# Patient Record
Sex: MALE | Race: WHITE | Employment: UNEMPLOYED | URBAN - METROPOLITAN AREA
[De-identification: names, ages, dates, MRNs, and addresses within clinical notes are randomized per-mention and may not be internally consistent; named-entity substitution may affect disease eponyms.]

---

## 2017-06-01 ENCOUNTER — OFFICE VISIT (OUTPATIENT)
Dept: FAMILY MEDICINE CLINIC | Age: 27
End: 2017-06-01

## 2017-06-01 VITALS
OXYGEN SATURATION: 95 % | SYSTOLIC BLOOD PRESSURE: 121 MMHG | RESPIRATION RATE: 16 BRPM | DIASTOLIC BLOOD PRESSURE: 74 MMHG | WEIGHT: 172 LBS | HEART RATE: 94 BPM | HEIGHT: 68 IN | BODY MASS INDEX: 26.07 KG/M2 | TEMPERATURE: 97.9 F

## 2017-06-01 DIAGNOSIS — F41.9 ANXIETY: Primary | ICD-10-CM

## 2017-06-01 DIAGNOSIS — Z72.0 TOBACCO ABUSE: ICD-10-CM

## 2017-06-01 DIAGNOSIS — F90.9 ATTENTION DEFICIT HYPERACTIVITY DISORDER (ADHD), UNSPECIFIED ADHD TYPE: ICD-10-CM

## 2017-06-01 RX ORDER — DEXTROAMPHETAMINE SACCHARATE, AMPHETAMINE ASPARTATE, DEXTROAMPHETAMINE SULFATE AND AMPHETAMINE SULFATE 7.5; 7.5; 7.5; 7.5 MG/1; MG/1; MG/1; MG/1
30 TABLET ORAL
COMMUNITY
End: 2017-07-04

## 2017-06-01 RX ORDER — BUSPIRONE HYDROCHLORIDE 10 MG/1
10 TABLET ORAL 2 TIMES DAILY
Qty: 60 TAB | Refills: 3 | Status: SHIPPED | OUTPATIENT
Start: 2017-06-01 | End: 2017-07-04

## 2017-06-01 NOTE — PROGRESS NOTES
HPI  Bertram Kebede is a 33 y.o. male being seen today for has a history of ADHD and has been in [facility]. Has hx of PTSD and anxiety and was taking adderrall for this. Had tried paxil in the past which didn't help and may have taken wellbutrin in the past but unsure if it helped. Was seeing family practice Dr who gave him adderrall and never saw psych. Chief Complaint   Patient presents with   [address] Problem   . he states that see HPI    Past Medical History:   Diagnosis Date    ADHD (attention deficit hyperactivity disorder)     Dyslexia          ROS  Patient states that he is feeling well. Denies complaints of chest pain, shortness of breath, swelling of legs, dizziness or weakness. he denies nausea, vomiting or diarrhea. Current Outpatient Prescriptions   Medication Sig    dextroamphetamine-amphetamine (ADDERALL) 30 mg tablet Take 30 mg by mouth.  busPIRone (BUSPAR) 10 mg tablet Take 1 Tab by mouth two (2) times a day. No current facility-administered medications for this visit. PE  Visit Vitals    /74 (BP 1 Location: Left arm, BP Patient Position: Sitting)    Pulse 94    Temp 97.9 °F (36.6 °C) (Oral)    Resp 16    Ht 5' 8\" (1.727 m)    Wt 172 lb (78 kg)    SpO2 95%    BMI 26.15 kg/m2        Alert and oriented with normal mood and affect. he is well developed and well nourished . Lungs are clear without wheezing. Heart rate is regular without murmurs or gallops. There is no lower extremity edema. No results found for this or any previous visit. Assessment and Plan:        ICD-10-CM ICD-9-CM    1. Anxiety F41.9 300.00    2. Attention deficit hyperactivity disorder (ADHD), unspecified ADHD type F90.9 314.01    3.  Tobacco abuse Z72.0 305.1      Trial of buspar and CSB for pt's area info provided with note stating that our clinic cannot provide medications such as adderrall      Faby Domingo NP

## 2017-06-01 NOTE — MR AVS SNAPSHOT
Visit Information Date & Time Provider Department Dept. Phone Encounter #  
 6/1/2017 10:30 AM Kell Dent  [facility] [id number] Upcoming Health Maintenance Date Due DTaP/Tdap/Td series (1 - Tdap) 3/12/2012 INFLUENZA AGE 9 TO ADULT 8/1/2017 Allergies as of 6/1/2017  Review Complete On: 6/1/2017 By: Sofia Alonso No Known Allergies Current Immunizations  Never Reviewed No immunizations on file. Not reviewed this visit You Were Diagnosed With   
  
 Codes Comments Anxiety    -  Primary ICD-10-CM: F41.9 ICD-9-CM: 300.00 Attention deficit hyperactivity disorder (ADHD), unspecified ADHD type     ICD-10-CM: F90.9 ICD-9-CM: 314.01 Tobacco abuse     ICD-10-CM: Z72.0 ICD-9-CM: 305.1 Vitals BP Pulse Temp Resp Height(growth percentile) Weight(growth percentile) 121/74 (BP 1 Location: Left arm, BP Patient Position: Sitting) 94 97.9 °F (36.6 °C) (Oral) 16 5' 8\" (1.727 m) 172 lb (78 kg) SpO2 BMI Smoking Status 95% 26.15 kg/m2 Current Every Day Smoker Vitals History BMI and BSA Data Body Mass Index Body Surface Area  
 26.15 kg/m 2 1.93 m 2 Preferred Pharmacy Pharmacy Name Phone [facility] [place] [address] [phone] Your Updated Medication List  
  
   
This list is accurate as of: 6/1/17 11:58 AM.  Always use your most recent med list.  
  
  
  
  
 ADDERALL 30 mg tablet Generic drug:  dextroamphetamine-amphetamine Take 30 mg by mouth.  
  
 busPIRone 10 mg tablet Commonly known as:  BUSPAR Take 1 Tab by mouth two (2) times a day. Prescriptions Sent to Pharmacy Refills  
 busPIRone (BUSPAR) 10 mg tablet 3 Sig: Take 1 Tab by mouth two (2) times a day. Class: Normal  
 Pharmacy: [address] [place] [address], [address] [place] [address] Ph #: [phone] Route: Oral  
  
Patient Instructions [address] Please call and tell them that your new doctor cannot prescribe adderrall and this was what worked for him in the past - had side effects from other medications. All patient appointments and eligibility confirmations are by appointment only. Please call [phone] during regular business hours to be scheduled. Business Hours:  
Monday - Friday: 8:00 a.m. - 5:00 p.m. Medical Clinic Hours:  
Monday: 9:00 a.m. - 4:00 p.m. Tuesday: 12:00 p.m. - 8:00 p.m. Wednesday: 8:00 a.m. - 4:00 p.m. Thursday: 9:00 a.m. - 4:00 p.m. Friday: 9:00 a.m. - 1:00 p.m. Introducing [facility] SERVICES! [employer] introduces [employer] patient portal. Now you can access parts of your medical record, email your doctor's office, and request medication refills online. 1. In your internet browser, go to [email or web address] 2. Click on the First Time User? Click Here link in the Sign In box. You will see the New Member Sign Up page. 3. Enter your DDN Access Code exactly as it appears below. You will not need to use this code after youve completed the sign-up process. If you do not sign up before the expiration date, you must request a new code. · DDN Access Code: [email or web address] Expires: 8/30/2017 11:58 AM 
 
4. Enter the last four digits of your Social Security Number (xxxx) and Date of Birth (mm/dd/yyyy) as indicated and click Submit. You will be taken to the next sign-up page. 5. Create a Beept ID. This will be your DDN login ID and cannot be changed, so think of one that is secure and easy to remember. 6. Create a Beept password. You can change your password at any time. 7. Enter your Password Reset Question and Answer. This can be used at a later time if you forget your password. 8. Enter your e-mail address. You will receive e-mail notification when new information is available in [address] 9. Click Sign Up. You can now view and download portions of your medical record. 10. Click the Download Summary menu link to download a portable copy of your medical information. If you have questions, please visit the Frequently Asked Questions section of the [facility] website. Remember, [facility] is NOT to be used for urgent needs. For medical emergencies, dial 911. Now available from your iPhone and Android! Please provide this summary of care documentation to your next provider. If you have any questions after today's visit, please call [phone].

## 2017-06-01 NOTE — PATIENT INSTRUCTIONS
[address] [place] [address] INFO    Please call and tell them that your new doctor cannot prescribe adderrall and this was what worked for him in the past - had side effects from other medications. All patient appointments and eligibility confirmations are by appointment only. Please call [phone] during regular business hours to be scheduled. Business Hours:   Monday - Friday: 8:00 a.m. - 5:00 p.m. Medical Clinic Hours:   Monday: 9:00 a.m. - 4:00 p.m. Tuesday: 12:00 p.m. - 8:00 p.m. Wednesday: 8:00 a.m. - 4:00 p.m. Thursday: 9:00 a.m. - 4:00 p.m. Friday: 9:00 a.m. - 1:00 p.m.

## 2017-06-01 NOTE — PROGRESS NOTES
No chief complaint on file. 1. When and where did you last receive medical care? Yes When: 5/2017 Where: [facility] Reason for visit: Psych Meds    2. When and where did you last have preventive care such as mammogram, pap smears or colon screening? No    3. What is your current living situation (for example, live alone, live in home with immediate family members)? Family    4. Do you have any problems with communication such trouble seeing, hearing, or understanding instructions? No    5. Do you have an advance directive? This is a document that you can give to family members with instructions for how you would want them to make health care decisions for you if you were unable to speak for yourself. (For example, unconscious, delerious)No    PMH/FH/Social Hx reviewed and updated as needed     Applicable screenings reviewed and updated as needed  Medication reconciliation performed. Patient does need medication refills. Health Maintenance reviewed.

## 2017-06-01 NOTE — PROGRESS NOTES
Discharge instructions reviewed with patient    Medication list and understanding of medications reviewed with patient. OTC and herbal medications reviewed and added to med list if applicable  Barriers to adherence assessed. Guidance given regarding new medications this visit, including reason for taking this medicine, and common side effects.     Given resources for [facility]

## 2017-06-02 NOTE — PROGRESS NOTES
I have reviewed the attatched progress note and I agree with the plan and medical decision making as outlined by Vini Baker MD

## 2017-07-04 ENCOUNTER — HOSPITAL ENCOUNTER (EMERGENCY)
Age: 27
Discharge: HOME | End: 2017-07-04
Attending: EMERGENCY MEDICINE
Payer: SELF-PAY

## 2017-07-04 VITALS
BODY MASS INDEX: 22.4 KG/M2 | DIASTOLIC BLOOD PRESSURE: 78 MMHG | TEMPERATURE: 98 F | RESPIRATION RATE: 18 BRPM | WEIGHT: 169 LBS | HEIGHT: 73 IN | SYSTOLIC BLOOD PRESSURE: 127 MMHG | OXYGEN SATURATION: 98 % | HEART RATE: 97 BPM

## 2017-07-04 DIAGNOSIS — L03.211 CELLULITIS OF FACE: Primary | ICD-10-CM

## 2017-07-04 PROCEDURE — 99282 EMERGENCY DEPT VISIT SF MDM: CPT

## 2017-07-04 RX ORDER — SULFAMETHOXAZOLE AND TRIMETHOPRIM 800; 160 MG/1; MG/1
2 TABLET ORAL 2 TIMES DAILY
Qty: 28 TAB | Refills: 0 | Status: SHIPPED | OUTPATIENT
Start: 2017-07-04 | End: 2017-07-11

## 2017-07-04 NOTE — ED PROVIDER NOTES
HPI Comments: 33 y.o. male with past medical history significant for dyslexia and ADHD who presents with chief complaint of \"insect bite. \"  Patient began to notice some swelling and \"soreness\" underneath the chin ~8 hours PTA. Patient suspects that he might have been \"bitten by a spider,\" but is unable to recall having actually been bitten. Patient denies any h/o diabetes, and denies being on any medications regularly. Patient reports shaving regularly. Patient mentions receiving tetanus vaccination within the past 1-2 years. Patient denies any fever, nausea/vomiting, trouble swallowing or breathing or dental problems. There are no other acute medical concerns at this time. Social hx: +tobacco smoker; denies EtOH; denies illicit drug use    Note written by Stacy Conti, as dictated by Anita Figueroa MD 11:20 AM    The history is provided by the patient. Past Medical History:   Diagnosis Date    ADHD (attention deficit hyperactivity disorder)     Dyslexia        No past surgical history on file. Family History:   Problem Relation Age of Onset    No Known Problems Mother     No Known Problems Sister     No Known Problems Brother        Social History     Social History    Marital status: SINGLE     Spouse name: N/A    Number of children: N/A    Years of education: N/A     Occupational History    Not on file. Social History Main Topics    Smoking status: Current Every Day Smoker     Packs/day: 1.50    Smokeless tobacco: Former User    Alcohol use Yes      Comment: Social    Drug use: No    Sexual activity: Not on file     Other Topics Concern    Not on file     Social History Narrative    No narrative on file         ALLERGIES: Review of patient's allergies indicates no known allergies. Review of Systems   Constitutional: Negative for fever. HENT: Negative for dental problem. Gastrointestinal: Negative for nausea and vomiting. Skin: Positive for wound.    All other systems reviewed and are negative. Vitals:    07/04/17 1112   BP: 127/78   Pulse: 97   Resp: 18   Temp: 98 °F (36.7 °C)   SpO2: 98%   Weight: 76.7 kg (169 lb)   Height: 6' 1\" (1.854 m)            Physical Exam   Constitutional: He is oriented to person, place, and time. He appears well-developed and well-nourished. No distress. HENT:   Head: Normocephalic and atraumatic. Edentulous; no pain or swelling of gums, floor of mouth soft, no trismus; 4 cm area of induration under chin; no fluctuance or drainage   Eyes: Conjunctivae are normal.   Neck: Normal range of motion. No tracheal deviation present. Cardiovascular: Normal rate, regular rhythm, normal heart sounds and intact distal pulses. Exam reveals no friction rub. No murmur heard. Pulmonary/Chest: Effort normal and breath sounds normal. No stridor. No respiratory distress. He has no wheezes. He has no rales. Abdominal: Soft. Bowel sounds are normal. He exhibits no distension. There is no tenderness. There is no rebound and no guarding. Musculoskeletal: Normal range of motion. Lymphadenopathy:     He has no cervical adenopathy. Neurological: He is alert and oriented to person, place, and time. Coordination normal.   Skin: Skin is warm and dry. He is not diaphoretic. No pallor. Psychiatric: He has a normal mood and affect. His behavior is normal.   Nursing note and vitals reviewed. MDM  Number of Diagnoses or Management Options  Cellulitis of face:   Diagnosis management comments: Likely cellulitis or folliculitis from shaving.  Spoke with pt at length about need for follow up and if swelling increases or develops trouble breathing or swallowing will need to return    Patient Progress  Patient progress: stable    ED Course       Procedures

## 2017-07-04 NOTE — DISCHARGE INSTRUCTIONS
We hope that we have addressed all of your medical concerns. The examination and treatment you received in the Emergency Department were for an emergent problem and were not intended as complete care. It is important that you follow up with your healthcare provider(s) for ongoing care. If your symptoms worsen or do not improve as expected, and you are unable to reach your usual health care provider(s), you should return to the Emergency Department. Today's healthcare is undergoing tremendous change, and patient satisfaction surveys are one of the many tools to assess the quality of medical care. You may receive a survey from the [employer] organization regarding your experience in the Emergency Department. I hope that your experience has been completely positive, particularly the medical care that I provided. As such, please participate in the survey; anything less than excellent does not meet my expectations or intentions. [facility] and 8 [facility] participate in nationally recognized quality of care measures. If your blood pressure is greater than 120/80, as reported below, we urge that you seek medical care to address the potential of high blood pressure, commonly known as hypertension. Hypertension can be hereditary or can be caused by certain medical conditions, pain, stress, or \"white coat syndrome. \"       Please make an appointment with your health care provider(s) for follow up of your Emergency Department visit. VITALS:   Patient Vitals for the past 8 hrs:   Temp Pulse Resp BP SpO2   07/04/17 1112 98 °F (36.7 °C) 97 18 127/78 98 %          Thank you for allowing us to provide you with medical care today. We realize that you have many choices for your emergency care needs. Please choose us in the future for any continued health care needs.       Nivia Cast MD    [facility]   Office: [phone]            No results found for this or any previous visit (from the past 24 hour(s)). No results found. Cellulitis: Care Instructions  Your Care Instructions    Cellulitis is a skin infection. It often occurs after a break in the skin from a scrape, cut, bite, or puncture, or after a rash. The doctor has checked you carefully, but problems can develop later. If you notice any problems or new symptoms, get medical treatment right away. Follow-up care is a key part of your treatment and safety. Be sure to make and go to all appointments, and call your doctor if you are having problems. It's also a good idea to know your test results and keep a list of the medicines you take. How can you care for yourself at home? · Take your antibiotics as directed. Do not stop taking them just because you feel better. You need to take the full course of antibiotics. · Prop up the infected area on pillows to reduce pain and swelling. Try to keep the area above the level of your heart as often as you can. · If your doctor told you how to care for your wound, follow your doctor's instructions. If you did not get instructions, follow this general advice:  ¨ Wash the wound with clean water 2 times a day. Don't use hydrogen peroxide or alcohol, which can slow healing. ¨ You may cover the wound with a thin layer of petroleum jelly, such as Vaseline, and a nonstick bandage. ¨ Apply more petroleum jelly and replace the bandage as needed. · Be safe with medicines. Take pain medicines exactly as directed. ¨ If the doctor gave you a prescription medicine for pain, take it as prescribed. ¨ If you are not taking a prescription pain medicine, ask your doctor if you can take an over-the-counter medicine. To prevent cellulitis in the future  · Try to prevent cuts, scrapes, or other injuries to your skin. Cellulitis most often occurs where there is a break in the skin.   · If you get a scrape, cut, mild burn, or bite, wash the wound with clean water as soon as you can to help avoid infection. Don't use hydrogen peroxide or alcohol, which can slow healing. · If you have swelling in your legs (edema), support stockings and good skin care may help prevent leg sores and cellulitis. · Take care of your feet, especially if you have diabetes or other conditions that increase the risk of infection. Wear shoes and socks. Do not go barefoot. If you have athlete's foot or other skin problems on your feet, talk to your doctor about how to treat them. When should you call for help? Call your doctor now or seek immediate medical care if:  · You have signs that your infection is getting worse, such as:  ¨ Increased pain, swelling, warmth, or redness. ¨ Red streaks leading from the area. ¨ Pus draining from the area. ¨ A fever. · You get a rash. Watch closely for changes in your health, and be sure to contact your doctor if:  · You are not getting better after 1 day (24 hours). · You do not get better as expected. Where can you learn more? Go to http://gloria-yolette.info/. Harish Mariscal in the search box to learn more about \"Cellulitis: Care Instructions. \"  Current as of: October 13, 2016  Content Version: 11.3  © 4950-6640 [employer]. Care instructions adapted under license by [employer] (which disclaims liability or warranty for this information). If you have questions about a medical condition or this instruction, always ask your healthcare professional. [place] [address] any warranty or liability for your use of this information.

## 2018-01-16 ENCOUNTER — HOSPITAL ENCOUNTER (EMERGENCY)
Age: 28
Discharge: OTHER INSTITUTION | End: 2018-01-17
Attending: EMERGENCY MEDICINE
Payer: SELF-PAY

## 2018-01-16 DIAGNOSIS — M46.46 DISCITIS OF LUMBAR REGION: Primary | ICD-10-CM

## 2018-01-16 LAB
ANION GAP SERPL CALC-SCNC: 11 MMOL/L (ref 3–18)
BASOPHILS # BLD: 0 K/UL (ref 0–0.1)
BASOPHILS NFR BLD: 0 % (ref 0–3)
BUN SERPL-MCNC: 11 MG/DL (ref 7–18)
BUN/CREAT SERPL: 16 (ref 12–20)
CALCIUM SERPL-MCNC: 8.9 MG/DL (ref 8.5–10.1)
CHLORIDE SERPL-SCNC: 97 MMOL/L (ref 100–108)
CO2 SERPL-SCNC: 27 MMOL/L (ref 21–32)
CREAT SERPL-MCNC: 0.69 MG/DL (ref 0.6–1.3)
DIFFERENTIAL METHOD BLD: ABNORMAL
EOSINOPHIL # BLD: 0.5 K/UL (ref 0–0.4)
EOSINOPHIL NFR BLD: 3 % (ref 0–5)
ERYTHROCYTE [DISTWIDTH] IN BLOOD BY AUTOMATED COUNT: 13.1 % (ref 11.6–14.5)
GLUCOSE SERPL-MCNC: 130 MG/DL (ref 74–99)
HCT VFR BLD AUTO: 42.5 % (ref 36–48)
HGB BLD-MCNC: 14.2 G/DL (ref 13–16)
LACTATE BLD-SCNC: 1.8 MMOL/L (ref 0.4–2)
LYMPHOCYTES # BLD: 2.6 K/UL (ref 0.8–3.5)
LYMPHOCYTES NFR BLD: 15 % (ref 20–51)
MCH RBC QN AUTO: 29.8 PG (ref 24–34)
MCHC RBC AUTO-ENTMCNC: 33.4 G/DL (ref 31–37)
MCV RBC AUTO: 89.1 FL (ref 74–97)
MONOCYTES # BLD: 1.4 K/UL (ref 0–1)
MONOCYTES NFR BLD: 8 % (ref 2–9)
NEUTS SEG # BLD: 13 K/UL (ref 1.8–8)
NEUTS SEG NFR BLD: 74 % (ref 42–75)
PLATELET # BLD AUTO: 405 K/UL (ref 135–420)
PLATELET COMMENTS,PCOM: ABNORMAL
PMV BLD AUTO: 7.9 FL (ref 9.2–11.8)
POTASSIUM SERPL-SCNC: 3.3 MMOL/L (ref 3.5–5.5)
RBC # BLD AUTO: 4.77 M/UL (ref 4.7–5.5)
RBC MORPH BLD: ABNORMAL
SODIUM SERPL-SCNC: 135 MMOL/L (ref 136–145)
WBC # BLD AUTO: 17.5 K/UL (ref 4.6–13.2)

## 2018-01-16 PROCEDURE — 74011250636 HC RX REV CODE- 250/636: Performed by: EMERGENCY MEDICINE

## 2018-01-16 PROCEDURE — 80048 BASIC METABOLIC PNL TOTAL CA: CPT | Performed by: EMERGENCY MEDICINE

## 2018-01-16 PROCEDURE — 99284 EMERGENCY DEPT VISIT MOD MDM: CPT

## 2018-01-16 PROCEDURE — 83605 ASSAY OF LACTIC ACID: CPT

## 2018-01-16 PROCEDURE — 96361 HYDRATE IV INFUSION ADD-ON: CPT

## 2018-01-16 PROCEDURE — 85025 COMPLETE CBC W/AUTO DIFF WBC: CPT | Performed by: EMERGENCY MEDICINE

## 2018-01-16 RX ORDER — SODIUM CHLORIDE 9 MG/ML
125 INJECTION, SOLUTION INTRAVENOUS CONTINUOUS
Status: DISCONTINUED | OUTPATIENT
Start: 2018-01-16 | End: 2018-01-17 | Stop reason: HOSPADM

## 2018-01-16 RX ADMIN — SODIUM CHLORIDE 125 ML/HR: 9 INJECTION, SOLUTION INTRAVENOUS at 23:24

## 2018-01-17 VITALS
SYSTOLIC BLOOD PRESSURE: 113 MMHG | WEIGHT: 169 LBS | RESPIRATION RATE: 15 BRPM | DIASTOLIC BLOOD PRESSURE: 64 MMHG | TEMPERATURE: 98.9 F | OXYGEN SATURATION: 97 % | HEART RATE: 79 BPM | BODY MASS INDEX: 22.3 KG/M2

## 2018-01-17 PROCEDURE — 96365 THER/PROPH/DIAG IV INF INIT: CPT

## 2018-01-17 PROCEDURE — 74011250636 HC RX REV CODE- 250/636: Performed by: EMERGENCY MEDICINE

## 2018-01-17 PROCEDURE — 96366 THER/PROPH/DIAG IV INF ADDON: CPT

## 2018-01-17 RX ADMIN — SODIUM CHLORIDE 1000 MG: 900 INJECTION, SOLUTION INTRAVENOUS at 00:06

## 2018-01-17 NOTE — ED NOTES
TRANSFER - OUT REPORT:    Verbal report given to Ronak Garcia RN(name) on Sarah Ghosh  being transferred to [facility] room 323(unit) for routine progression of care       Report consisted of patients Situation, Background, Assessment and   Recommendations(SBAR). Information from the following report(s) SBAR, ED Summary, [facility] and Recent Results was reviewed with the receiving nurse. Lines:       Opportunity for questions and clarification was provided.       Patient transported with:   Monitor

## 2018-01-17 NOTE — ED NOTES
[facility]

## 2018-01-17 NOTE — ED TRIAGE NOTES
Patient states that he has infection to his spine. States that he left hospital due to friend's death. States difficulty controlling bowels and bladder. Advises of difficulty with sensation in feet. C/o headache. States infection is worsening. Patient noted to have osteomyelitis of the spine dx from [address].

## 2018-01-17 NOTE — ED PROVIDER NOTES
EMERGENCY DEPARTMENT HISTORY AND PHYSICAL EXAM    11:05 PM      Date: 1/16/2018  Patient Name: Parag Blake    History of Presenting Illness     Chief Complaint   Patient presents with    Other     spine infection    Incontinence    Headache    Fever         History Provided By: Patient    Chief Complaint: Spinal Infection   Duration: 3 Days  Timing:  Constant, Progressive and Worsening  Location: Spine   Severity: Severe  Modifying Factors: nothing makes the Sx better or worse   Associated Symptoms: numbness, tingling, bladder and bowel incontinence       Additional History (Context): Parag Blake is a 33 y.o. male with PMHx of Osteomyelitis, Hepatitis C, and ADHD who presents for the evaluation of a severe constant and progressively worsening spinal infection. Nothing makes the Sx better or worse. Pt was at [facility] and left x 3 days ago. He signed out AMA after being admitted for Osteomyelitis of lumbar spine. States worsening of numbness and tingling in his feet. Associated symptoms also include worsening bladder and bowel incontinence. Pt returns tonight willing to be re-admitted for full course of treatment. Persistent tobacco use. Denies any further complaints or symptoms at the moment. PCP: None        Past History     Past Medical History:  Past Medical History:   Diagnosis Date    ADHD (attention deficit hyperactivity disorder)     Dyslexia     Osteomyelitis ([address]        Past Surgical History:  History reviewed. No pertinent surgical history.     Family History:  Family History   Problem Relation Age of Onset    No Known Problems Mother     No Known Problems Sister     No Known Problems Brother        Social History:  Social History   Substance Use Topics    Smoking status: Current Every Day Smoker     Packs/day: 1.50    Smokeless tobacco: Former User    Alcohol use Yes      Comment: Social       Allergies:  No Known Allergies      Review of Systems       Review of Systems   Constitutional: Negative for chills, fatigue and fever. HENT: Negative. Negative for sore throat. Eyes: Negative. Respiratory: Negative for cough and shortness of breath. Cardiovascular: Negative for chest pain and palpitations. Gastrointestinal: Negative for abdominal pain, nausea and vomiting. Positive for bowel incontinence    Genitourinary: Negative for dysuria. Positive for urine incontinence     Musculoskeletal: Negative. Skin: Negative. Neurological: Positive for numbness. Negative for dizziness, weakness, light-headedness and headaches. Positive for tingling    Psychiatric/Behavioral: Negative. All other systems reviewed and are negative. Physical Exam   There were no vitals taken for this visit. Physical Exam   Constitutional: He is oriented to person, place, and time. He appears well-developed and well-nourished. HENT:   Head: Normocephalic and atraumatic. Mouth/Throat: Oropharynx is clear and moist.   Eyes: Conjunctivae are normal. Pupils are equal, round, and reactive to light. No scleral icterus. Neck: Normal range of motion. Neck supple. No JVD present. No tracheal deviation present. Cardiovascular: Normal rate, regular rhythm and normal heart sounds. Pulmonary/Chest: Effort normal and breath sounds normal. No respiratory distress. He has no wheezes. Abdominal: Soft. Bowel sounds are normal.   Musculoskeletal: Normal range of motion. Multiple track marks of various ages bilaterally in arms. Neurological: He is alert and oriented to person, place, and time. He has normal strength. Gait normal. GCS eye subscore is 4. GCS verbal subscore is 5. GCS motor subscore is 6. Weakened strength in feet and lower legs bilat. Skin: Skin is warm and dry. Psychiatric: He has a normal mood and affect. Nursing note and vitals reviewed.         Diagnostic Study Results     Labs -  No results found for this or any previous visit (from the past 12 hour(s)). Radiologic Studies -   No orders to display         Medical Decision Making   I am the first provider for this patient. I reviewed the vital signs, available nursing notes, past medical history, past surgical history, family history and social history. Vital Signs-Reviewed the patient's vital signs. Pulse Oximetry Analysis -  97 % on RA, normal     Records Reviewed: Nursing Notes (Time of Review: 11:05 PM)    ED Course: Progress Notes, Reevaluation, and Consults:    2:15 AM Consult: I discussed care with Dr. Prince Rivera (Hospitalist ([facility]) ). It was a standard discussion including patient history, chief complaint, available diagnostic results, and predicted treatment course. Agrees to accept pt. Provider Notes (Medical Decision Making): Pt left [facility] 3 days ago after being diagnosed with osteomyelitis of lumbar spine. I've reviewed his chart from [facility]. Will restart him on Abx and contact [facility] to arrange admit. Pt says he is now willing to be admitted and will accept treatment for his condition. Says his sx of incontinance and numbness in feet and toes has worsened since he left the hospital 3 days ago. For Hospitalized Patients:    1. Hospitalization Decision Time:      Diagnosis     Clinical Impression: No diagnosis found. Disposition: transfer    Follow-up Information     None           Patient's Medications    No medications on file     _______________________________    Attestations:  Stacy Mitchell (Aj) acting as a scribe for and in the presence of Mallika Reyes MD      January 16, 2018 at 11:05 PM       Provider Attestation:      I personally performed the services described in the documentation, reviewed the documentation, as recorded by the scribe in my presence, and it accurately and completely records my words and actions.  January 16, 2018 at 11:05 PM - Mallika Reyes MD    _______________________________  Pt was transferred to [address] [place] [address] after being accepted by hospitalist.  Urvashi Bar MD

## 2018-02-13 ENCOUNTER — OFFICE VISIT (OUTPATIENT)
Dept: FAMILY MEDICINE CLINIC | Age: 28
End: 2018-02-13

## 2018-02-13 VITALS
WEIGHT: 174 LBS | BODY MASS INDEX: 25.77 KG/M2 | HEIGHT: 69 IN | RESPIRATION RATE: 16 BRPM | DIASTOLIC BLOOD PRESSURE: 88 MMHG | SYSTOLIC BLOOD PRESSURE: 148 MMHG | OXYGEN SATURATION: 99 % | TEMPERATURE: 97.9 F | HEART RATE: 110 BPM

## 2018-02-13 DIAGNOSIS — F41.9 ANXIETY: ICD-10-CM

## 2018-02-13 DIAGNOSIS — F90.9 ATTENTION DEFICIT HYPERACTIVITY DISORDER (ADHD), UNSPECIFIED ADHD TYPE: Primary | ICD-10-CM

## 2018-02-13 RX ORDER — BUPRENORPHINE AND NALOXONE 8; 2 MG/1; MG/1
1 FILM, SOLUBLE BUCCAL; SUBLINGUAL
COMMUNITY

## 2018-02-13 NOTE — PATIENT INSTRUCTIONS
Learning About Attention Deficit Hyperactivity Disorder (ADHD) in Adults  What is ADHD? Attention deficit hyperactivity disorder (ADHD) is a condition in which people have a hard time paying attention. Adults with ADHD also may be more active than normal. They tend to act without thinking. ADHD may make it harder for them to focus, get organized, and finish tasks. ADHD most often starts in childhood and lasts into adulthood. Many adults don't know that they have ADHD until their children are diagnosed. Then they begin to see their own symptoms. Doctors don't know what causes ADHD. But it tends to run in families. What are the symptoms? The most common types of ADHD symptoms in adults are attention problems and hyperactivity. Attention problems  Adults with ADHD often find it hard to:  · Finish tasks that don't interest them or aren't easy. But they may become obsessed with activities that they find interesting and enjoy. · Keep relationships. · Focus their attention on conversations, reading materials, or jobs. They may change jobs a lot. · Remember things. They may misplace or lose things. · Pay attention. They are easily distracted. They find it hard to focus on one task. · Think before they act. They may make quick decisions. They may act before they think about the effect of their actions. Hyperactivity  Adults with ADHD may:  · Fidget. They may swing their legs, shift in their seats, or tap their fingers. · Move around a lot. They may feel \"revved up\" or on the go. They may not be able to slow down until they are very tired. · Find it hard to relax. They may feel restless and find it hard to do quiet things like read or watch TV. How does ADHD affect daily life? ADHD in adults may affect:  · Job performance. They may find it hard to organize their work, manage their time, and focus on one task at a time. They may forget, misplace, or lose things.  They may quit their jobs out of boredom. · Relationships. Adults with ADHD may find it hard to focus their attention on conversations. It is hard for them to \"read\" the behavior and moods of others and express their own feelings. · Temper. They may get easily frustrated. This often can make it harder for them to deal with stress. These adults may overreact and have a short, quick temper. · The ability to solve problems. Adults who have a hard time waiting for things they want may act before they think about the effect of their actions. They may take part in risky behaviors. These include unprotected sex, unsafe driving, alcohol and drug use, or unwise business ventures. How is ADHD treated? ?ADHD can be treated with medicines, behavior training, or counseling. Or it may be a combination of these treatments. Medicines  ? Stimulant medicines are most often used to treat ADHD. These may include:  ? · Amphetamines (such as Adderall and Dexedrine). ? · Methylphenidate (such as Concerta, Daytrana, Focalin, Metadate, and Ritalin). ? Other medicines that may be used are:  ? · Atomoxetine, such as Strattera, a nonstimulant medicine for ADHD. ? · Antihypertensives. These include clonidine (such as Catapres) and guanfacine (such as Tenex). ? · Antidepressants, which include bupropion (Wellbutrin). ?Behavior training  ? Behavior training can help adults with ADHD learn how to:  ? · Get organized. A daily organizer or planner can help these adults organize their daily tasks. They can write down appointments and other things they need to remember. ? · Decrease distractions. They can set up their work or home environment so that there are fewer things that will distract them. They may find using headphones or a \"white noise\" machine helpful. College students can arrange a quiet living situation. They may need a single dorm room. ? · Work on relationships. Social skills training can help adults with ADHD relate to family, friends, and coworkers. Couples counseling or family therapy can also help improve relationships. ? Counseling  ? Counseling is not meant to treat inattention, hyperactivity, or impulsiveness. But it can help with some of the problems that go along with ADHD. These include not getting along well with others and having problems following rules. Where can you learn more? Go to http://gloria-yolette.info/. Enter L775 in the search box to learn more about \"Learning About Attention Deficit Hyperactivity Disorder (ADHD) in Adults. \"  Current as of: May 12, 2017  Content Version: 11.4  © 0049-9065 [employer], [employer]. Care instructions adapted under license by [employer] (which disclaims liability or warranty for this information). If you have questions about a medical condition or this instruction, always ask your healthcare professional. [address] any warranty or liability for your use of this information.

## 2018-02-13 NOTE — PROGRESS NOTES
Chief Complaint   Patient presents with   [facility].     pt was in ER for spinal infection 'a few weeks ago' x 2 weeks.  Medication Refill     pt takes ADHD meds. would like to get back on them.

## 2018-02-13 NOTE — PROGRESS NOTES
Steph Matamoros is a 33 y.o. male  presents for establish care. He has history of ADD. He wants to be back on his meds. No Known Allergies    Patient Active Problem List   Diagnosis Code    Tobacco abuse Z72.0    Attention deficit hyperactivity disorder (ADHD) F90.9    Anxiety F41.9     Past Medical History:   Diagnosis Date    ADHD (attention deficit hyperactivity disorder)     Dyslexia     Osteomyelitis ([address]      Social History     Social History    Marital status: SINGLE     Spouse name: N/A    Number of children: N/A    Years of education: N/A     Social History Main Topics    Smoking status: Current Every Day Smoker     Packs/day: 1.50    Smokeless tobacco: Former User    Alcohol use Yes      Comment: Social    Drug use: No    Sexual activity: Not Asked     Other Topics Concern    None     Social History Narrative    ** Merged History Encounter **          Family History   Problem Relation Age of Onset    No Known Problems Mother     No Known Problems Sister     No Known Problems Brother         Review of Systems   Psychiatric/Behavioral: Negative. Negative for depression, hallucinations, memory loss, substance abuse and suicidal ideas. The patient is not nervous/anxious and does not have insomnia. Vitals:    02/13/18 0906   BP: 148/88   Pulse: (!) 110   Resp: 16   Temp: 97.9 °F (36.6 °C)   SpO2: 99%   Weight: 174 lb (78.9 kg)   Height: 5' 9.25\" (1.759 m)   PainSc:   4   PainLoc: Generalized       Physical Exam   Constitutional: He is oriented to person, place, and time and well-developed, well-nourished, and in no distress. Cardiovascular: Normal rate, regular rhythm and normal heart sounds. Pulmonary/Chest: Effort normal and breath sounds normal.   Neurological: He is alert and oriented to person, place, and time. Psychiatric: Mood, memory, affect and judgment normal.   Nursing note and vitals reviewed. Assessment/Plan      ICD-10-CM ICD-9-CM    1.  Attention deficit hyperactivity disorder (ADHD), unspecified ADHD type F90.9 314.01    2. Anxiety F41.9 300.00      Reviewed patient's labs from [facility] . He has positive UDS for illegal drugs. Will send letter to dismiss from practice.     He states that he has not used illegal drugs but has positive screen 1/9/18.    lab results and schedule of future lab studies reviewed with patient    Bety Bryan MD

## 2018-02-15 ENCOUNTER — TELEPHONE (OUTPATIENT)
Dept: FAMILY MEDICINE CLINIC | Age: 28
End: 2018-02-15

## 2018-02-15 NOTE — TELEPHONE ENCOUNTER
Patient called to check on records/med list her dropped off. He wants to know if med can be prescribed or if he needs to find another doctor to prescribe it. Please call him at [phone].

## 2018-02-16 NOTE — TELEPHONE ENCOUNTER
Patient called today to inquire about records that were dropped off. Patient would like a call back.

## 2018-02-16 NOTE — TELEPHONE ENCOUNTER
Spoke to patient to let him know that Dr. Layton Lindsay will not be prescribing medications he is requesting. Pt states 'okay' and that he will be finding another practice.